# Patient Record
Sex: MALE | Race: WHITE | NOT HISPANIC OR LATINO | Employment: FULL TIME | URBAN - NONMETROPOLITAN AREA
[De-identification: names, ages, dates, MRNs, and addresses within clinical notes are randomized per-mention and may not be internally consistent; named-entity substitution may affect disease eponyms.]

---

## 2019-10-17 ENCOUNTER — TRANSCRIBE ORDERS (OUTPATIENT)
Dept: PHYSICAL THERAPY | Facility: CLINIC | Age: 44
End: 2019-10-17

## 2019-10-17 ENCOUNTER — EVALUATION (OUTPATIENT)
Dept: PHYSICAL THERAPY | Facility: CLINIC | Age: 44
End: 2019-10-17
Payer: COMMERCIAL

## 2019-10-17 DIAGNOSIS — S84.12XD INJURY OF LEFT PERONEAL NERVE, SUBSEQUENT ENCOUNTER: Primary | ICD-10-CM

## 2019-10-17 DIAGNOSIS — R26.2 AMBULATORY DYSFUNCTION: ICD-10-CM

## 2019-10-17 DIAGNOSIS — M21.372 FOOT DROP, LEFT: ICD-10-CM

## 2019-10-17 PROCEDURE — 97110 THERAPEUTIC EXERCISES: CPT

## 2019-10-17 PROCEDURE — 97161 PT EVAL LOW COMPLEX 20 MIN: CPT

## 2019-10-17 NOTE — LETTER
2019    Paris Sepulveda MD  [address],[place] [address]    Patient: Rey Khan   YOB: 1975   Date of Visit: 10/17/2019     Encounter Diagnosis     ICD-10-CM    1  Injury of left peroneal nerve, subsequent encounter S84  12XD    2  Foot drop, left M21 372    3  Ambulatory dysfunction R26 2        Dear Dr Richardson Moreno: Thank you for your recent referral of Rey Khan  Please review the attached evaluation summary from Parth's recent visit  Please verify that you agree with the plan of care by signing the attached order  If you have any questions or concerns, please do not hesitate to call  I sincerely appreciate the opportunity to share in the care of one of your patients and hope to have another opportunity to work with you in the near future  Sincerely,    Pantera Castro, PT      Referring Provider:      I certify that I have read the below Plan of Care and certify the need for these services furnished under this plan of treatment while under my care  Paris Sepulveda MD  [address],[place] [address] [facility]: [phone]          PT Evaluation     Today's date: 10/17/2019  Patient name: Rey Khan  : 1975  MRN: [id number]  Referring provider: David Ocampo MD  Dx:   Encounter Diagnosis     ICD-10-CM    1  Injury of left peroneal nerve, subsequent encounter S84  12XD    2  Foot drop, left M21 372    3  Ambulatory dysfunction R26 2        Start Time: 08  Stop Time: 08  Total time in clinic (min): 33 minutes    Assessment  Assessment details: Pt is 53 y o  Male referred to physical therapy by Dr Richardson Moreno with diagnosis of left foot drop, peripheral neuropathy  Patient seen this date for initial PT evaluation  Received orders for PT eval and tx, Prior level of function, pt was fully independent and without previous ankle weakness    Personal factors affecting pt at time of initial evaluation include: Patient employment requires climbing on ladders, trucks and walking on uneven surfaces, he notes ankle instability on all of surfaces and reports that he has already overturned his ankle several times  I have encouraged him to purchase an ankle support to provide increased stability, however he appears hesitant to do so  Please find objective findings from PT evaluation outlined below, with impairments and limitations including gait dysfunction, muscle weakness, balance issues due to muscle weakness  Patient will benefit from continued PT tx to address deficits as defined above and maximize level of functional independent mobility in order to facilitate return to prior level of function    Impairments: abnormal gait, abnormal or restricted ROM, abnormal movement, activity intolerance, impaired balance, impaired physical strength, lacks appropriate home exercise program and safety issue    Symptom irritability: lowBarriers to therapy: none  Understanding of Dx/Px/POC: good  Goals  Short Term Goals to be achieved in 3-4 weeks  Patient will demonstrate normal heel toe gait pattern with good eccentric contraction during heel strike to foot flat  Improve ankle stability with ankle support until strength returns to normal  Performing light activities around your home - No difficulty  Functional score as measured by FOTO will improve to 57%  Long Term Goals to be achieved in 4-8 weeks  Patient will be independent in home exercise program  Patient ROM will increase to full ankle motion  Patient will demonstrate ankle stability WFL  Patient will increase score on FOTO to  72%  Squatting - No difficulty  Getting up or down 10 stairs (about 1 flight of stairs) - No difficulty  With any of your usual work, housework, or school activities - No difficulty  Performing heavy activities around your home - No difficulty  Standing for 1 hour - No difficulty  With your usual hobbies, recreational or sporting activities - No difficulty  Walking a mile - No difficulty      Plan  Plan details: Treatments may be added or discharged at the discretion of the physical therapist   Planned therapy interventions: patient education, strengthening, stretching, therapeutic exercise and home exercise program  Frequency: 2x week  Duration in visits: 11  Duration in weeks: 7  Plan of Care beginning date: 10/17/2019  Plan of Care expiration date: 2019        Subjective Evaluation    History of Present Illness  Date of onset: 10/13/2019  Mechanism of injury: 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  Could not move left ankle          Not a recurrent problem   Quality of life: excellent    Pain  Current pain ratin  At best pain ratin  At worst pain ratin    Social Support  Steps to enter house: yes  Stairs in house: yes   Lives in: multiple-level home  Lives with: significant other    Employment status: working  Treatments  Current treatment: physical therapy  Patient Goals  Patient goals for therapy: improved balance, increased motion, increased strength, independence with ADLs/IADLs and return to sport/leisure activities  Patient goal: able to resume normal gait pattern  feel more secure in the stability of my ankle        Objective     Observations   Left Ankle/Foot   Negative for abrasion, atrophy, edema and trophic changes       Neurological Testing     Sensation     Ankle/Foot   Left Ankle/Foot   Intact: light touch    Right Ankle/Foot   Intact: light touch     Active Range of Motion   Left Ankle/Foot   Dorsiflexion (ke): 5 degrees   Plantar flexion: WFL  Inversion: WFL  Eversion: WFL  Great toe flexion: WFL  Great toe extension: WFL    Passive Range of Motion   Left Ankle/Foot    Dorsiflexion (ke): WFL  Dorsiflexion (kf): WFL  Plantar flexion: WFL  Inversion: WFL  Eversion: WFL  Great toe flexion: WFL  Great toe extension: WFL    Strength/Myotome Testing     Left Knee   Flexion: 5  Extension: 5    Left Ankle/Foot Dorsiflexion: 2-  Plantar flexion: 4  Inversion: 4  Eversion: 4  Great toe flexion: 4-  Great toe extension: 4-    General Comments:      Lumbar Comments  Back screen negative    Hip Comments   WFL      Flowsheet Rows      Most Recent Value   PT/OT G-Codes   Current Score  37   Projected Score  72             Precautions: none          Exercise Diary  10/17       Towel stretch into DF knee flexed 5 x 5 "       Towel stretch into DF knee extended 5 x 5"       Active ankle DF supine X 30       Active ankle DF sitting   X 15       Unilateral stance on left X 30 sec       Eccentric DF        theraband all ankle motion

## 2019-10-17 NOTE — PROGRESS NOTES
PT Evaluation     Today's date: 10/17/2019  Patient name: Merlinda Ken  : 1975  MRN: [id number]  Referring provider: Cory Stokes MD  Dx:   Encounter Diagnosis     ICD-10-CM    1  Injury of left peroneal nerve, subsequent encounter S84  12XD    2  Foot drop, left M21 372    3  Ambulatory dysfunction R26 2        Start Time: 08  Stop Time: 08  Total time in clinic (min): 33 minutes    Assessment  Assessment details: Pt is 53 y o  Male referred to physical therapy by Dr Lorena Barclay with diagnosis of left foot drop, peripheral neuropathy  Patient seen this date for initial PT evaluation  Received orders for PT eval and tx, Prior level of function, pt was fully independent and without previous ankle weakness  Personal factors affecting pt at time of initial evaluation include: Patient employment requires climbing on ladders, trucks and walking on uneven surfaces, he notes ankle instability on all of surfaces and reports that he has already overturned his ankle several times  I have encouraged him to purchase an ankle support to provide increased stability, however he appears hesitant to do so  Please find objective findings from PT evaluation outlined below, with impairments and limitations including gait dysfunction, muscle weakness, balance issues due to muscle weakness  Patient will benefit from continued PT tx to address deficits as defined above and maximize level of functional independent mobility in order to facilitate return to prior level of function    Impairments: abnormal gait, abnormal or restricted ROM, abnormal movement, activity intolerance, impaired balance, impaired physical strength, lacks appropriate home exercise program and safety issue    Symptom irritability: lowBarriers to therapy: none  Understanding of Dx/Px/POC: good  Goals  Short Term Goals to be achieved in 3-4 weeks  Patient will demonstrate normal heel toe gait pattern with good eccentric contraction during heel strike to foot flat  Improve ankle stability with ankle support until strength returns to normal  Performing light activities around your home - No difficulty  Functional score as measured by FOTO will improve to 57%  Long Term Goals to be achieved in 4-8 weeks  Patient will be independent in home exercise program  Patient ROM will increase to full ankle motion  Patient will demonstrate ankle stability WFL  Patient will increase score on FOTO to  72%  Squatting - No difficulty  Getting up or down 10 stairs (about 1 flight of stairs) - No difficulty  With any of your usual work, housework, or school activities - No difficulty  Performing heavy activities around your home - No difficulty  Standing for 1 hour - No difficulty  With your usual hobbies, recreational or sporting activities - No difficulty  Walking a mile - No difficulty      Plan  Plan details: Treatments may be added or discharged at the discretion of the physical therapist   Planned therapy interventions: patient education, strengthening, stretching, therapeutic exercise and home exercise program  Frequency: 2x week  Duration in visits: 11  Duration in weeks: 7  Plan of Care beginning date: 10/17/2019  Plan of Care expiration date: 2019        Subjective Evaluation    History of Present Illness  Date of onset: 10/13/2019  Mechanism of injury: 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    Could not move left ankle          Not a recurrent problem   Quality of life: excellent    Pain  Current pain ratin  At best pain ratin  At worst pain ratin    Social Support  Steps to enter house: yes  Stairs in house: yes   Lives in: multiple-level home  Lives with: significant other    Employment status: working  Treatments  Current treatment: physical therapy  Patient Goals  Patient goals for therapy: improved balance, increased motion, increased strength, independence with ADLs/IADLs and return to sport/leisure activities  Patient goal: able to resume normal gait pattern  feel more secure in the stability of my ankle        Objective     Observations   Left Ankle/Foot   Negative for abrasion, atrophy, edema and trophic changes       Neurological Testing     Sensation     Ankle/Foot   Left Ankle/Foot   Intact: light touch    Right Ankle/Foot   Intact: light touch     Active Range of Motion   Left Ankle/Foot   Dorsiflexion (ke): 5 degrees   Plantar flexion: WFL  Inversion: WFL  Eversion: WFL  Great toe flexion: WFL  Great toe extension: WFL    Passive Range of Motion   Left Ankle/Foot    Dorsiflexion (ke): WFL  Dorsiflexion (kf): WFL  Plantar flexion: WFL  Inversion: WFL  Eversion: WFL  Great toe flexion: WFL  Great toe extension: WFL    Strength/Myotome Testing     Left Knee   Flexion: 5  Extension: 5    Left Ankle/Foot   Dorsiflexion: 2-  Plantar flexion: 4  Inversion: 4  Eversion: 4  Great toe flexion: 4-  Great toe extension: 4-    General Comments:      Lumbar Comments  Back screen negative    Hip Comments   WFL      Flowsheet Rows      Most Recent Value   PT/OT G-Codes   Current Score  37   Projected Score  72             Precautions: none          Exercise Diary  10/17       Towel stretch into DF knee flexed 5 x 5 "       Towel stretch into DF knee extended 5 x 5"       Active ankle DF supine X 30       Active ankle DF sitting   X 15       Unilateral stance on left X 30 sec       Eccentric DF        theraband all ankle motion

## 2019-10-21 ENCOUNTER — OFFICE VISIT (OUTPATIENT)
Dept: PHYSICAL THERAPY | Facility: CLINIC | Age: 44
End: 2019-10-21
Payer: COMMERCIAL

## 2019-10-21 DIAGNOSIS — S84.12XD INJURY OF LEFT PERONEAL NERVE, SUBSEQUENT ENCOUNTER: Primary | ICD-10-CM

## 2019-10-21 DIAGNOSIS — R26.2 AMBULATORY DYSFUNCTION: ICD-10-CM

## 2019-10-21 DIAGNOSIS — M21.372 FOOT DROP, LEFT: ICD-10-CM

## 2019-10-21 PROCEDURE — 97110 THERAPEUTIC EXERCISES: CPT

## 2019-10-21 NOTE — PROGRESS NOTES
Daily Note     Today's date: 10/21/2019  Patient name: Diyva Mcfadden  : 1975  MRN: [id number]  Referring provider: Julio Villagran MD  Dx:   Encounter Diagnosis     ICD-10-CM    1  Injury of left peroneal nerve, subsequent encounter S84  12XD    2  Foot drop, left M21 372    3  Ambulatory dysfunction R26 2        Start Time: 0800          Subjective: I could not use the clutch on the quad  I did not have enough strength  I do feel it is aout 20% better than last week      Objective: See treatment diary below      Assessment: Tolerated treatment well and progress treatment as tolerated  See only oe time per week to progress home exercise program  Patient exhibited good technique with therapeutic exercises and would benefit from continued PT  No foot drop noted this visit      Plan: Continue per plan of care  Progress treatment as tolerated         Precautions: none          Exercise Diary  10/17 10/21      Towel stretch into DF knee flexed 5 x 5 " 5 x 5 "      Towel stretch into DF knee extended 5 x 5" 5 x 5"      Active ankle DF supine X 30 HEP      Active ankle DF sitting   X 15 HEP      Unilateral stance on left X 30 sec X 1 min      Eccentric DF  X 30      theraband all ankle motion  DF x 10 Ev x 15      Lunges knees flexed and extended  2 x 10"      Tandem stand  1 min ea

## 2019-10-28 ENCOUNTER — APPOINTMENT (OUTPATIENT)
Dept: PHYSICAL THERAPY | Facility: CLINIC | Age: 44
End: 2019-10-28
Payer: COMMERCIAL

## 2019-10-28 NOTE — PROGRESS NOTES
Patient called  [place]    Feels 95% improved and wishes to be discharged from therapy

## 2019-10-28 NOTE — PROGRESS NOTES
Patient is out of state today  States he is doing much better  95% better at this time  Wishes discharge to [facility]    Discharge to home exercise program at this time